# Patient Record
Sex: FEMALE | Race: WHITE | URBAN - METROPOLITAN AREA
[De-identification: names, ages, dates, MRNs, and addresses within clinical notes are randomized per-mention and may not be internally consistent; named-entity substitution may affect disease eponyms.]

---

## 2023-05-09 ENCOUNTER — PROCEDURE (OUTPATIENT)
Facility: LOCATION | Age: 43
End: 2023-05-09
Payer: COMMERCIAL

## 2023-05-10 ENCOUNTER — POST-OPERATIVE VISIT (OUTPATIENT)
Facility: LOCATION | Age: 43
End: 2023-05-10
Payer: COMMERCIAL

## 2023-05-10 DIAGNOSIS — Z96.1 PRESENCE OF INTRAOCULAR LENS: Primary | ICD-10-CM

## 2023-05-10 PROCEDURE — 99024 POSTOP FOLLOW-UP VISIT: CPT | Performed by: OPTOMETRIST

## 2023-05-10 ASSESSMENT — INTRAOCULAR PRESSURE
OD: 20
OS: 18

## 2023-05-10 NOTE — IMPRESSION/PLAN
Impression: S/P CE/IOL VIVITY TRIFOCAL TORIC FEMTO ORA goal distance OD - 1 Day. Presence of intraocular lens  Z96.1. s/p CEIOL OD/OS surgery doing well. OD: 05/09/2023 PCIOL centered and clear. (-) Siedel sign, (-) suture (+) BCL OD - Removed in office today. Plan: Patient to continue OD: Moxifloxacin TID, Prednisolone Q2h, and Prolensa QD. OU: AT's PRN. RTC as scheduled for 1 week follow up Dr. Julisa Barragan on 05/12/2023 for 2nd eye clearance and consents. RTC PRN decrease VA, increase pain, redness, discharge, photophobia, flashes/floaters or other vision problems. Patient reminded of post-operative restrictions (do not rub eye, do not bend over, do not  more than 10-15 lbs, do not sleep on side of surgery, do not get water, dust or dirt in eye, wear plastic shield while sleeping).

## 2023-05-12 ENCOUNTER — POST-OPERATIVE VISIT (OUTPATIENT)
Facility: LOCATION | Age: 43
End: 2023-05-12
Payer: COMMERCIAL

## 2023-05-12 DIAGNOSIS — Z48.810 ENCOUNTER FOR SURGICAL AFTERCARE FOLLOWING SURGERY ON A SENSE ORGAN: Primary | ICD-10-CM

## 2023-05-12 DIAGNOSIS — H25.042 POSTERIOR SUBCAPSULAR POLAR AGE-RELATED CATARACT, LEFT EYE: ICD-10-CM

## 2023-05-12 PROCEDURE — 99024 POSTOP FOLLOW-UP VISIT: CPT

## 2023-05-12 ASSESSMENT — KERATOMETRY: OD: 43.56

## 2023-05-12 ASSESSMENT — VISUAL ACUITY: OD: 20/25

## 2023-05-12 ASSESSMENT — INTRAOCULAR PRESSURE
OS: 18
OD: 18

## 2023-05-12 NOTE — IMPRESSION/PLAN
Impression: Posterior subcapsular polar age-related cataract, left eye  H25.042. Plan: Discussed R/B/A of cataract surgery including risk of bleeding, infection, decreased BCVA, loss of eye. No guarantees, possible need  for further surgery. Patient expressed good understanding and wishes to proceed with CE/IOL OD then OS. Patient Elects to have cataract surgery + Vivity Trifocal + FEMTO + ORA OU. Pt aware she may need glasses after her sx for very up close vision. Explained to patient less than full visual recovery due to existing macular conditions. Patient expresses understanding. Plan cataract surgery OD 05/09/23. OS 05/16/23. Goal OD Distant. Goal OS -0.3 to -0.6. Cataract surgery packet given to patient. Patient advised to bring their surgery drops to all their upcoming appointments and that their surgery could be canceled if they do not bring their drops to their surgery.

## 2023-05-12 NOTE — IMPRESSION/PLAN
Impression: S/P CE/IOL Vivity Trifocal Toric FEMTO OD - 3 Days. Encounter for surgical aftercare following surgery on a sense organ  Z48.810. Plan: Starting 5/16/23. D/c Moxifloxacin. Continue Prolensa x 3 weeks then stop. Taper prednisolone to BID O_ x 3 weeks then stop. Updated medication schedule reviewed and given to pt. All restrictions lifted. RTC PRN decrease VA, increase pain, redness, discharge, photophobia, flashes/floaters, or other vision problems. Discussed R/B/A of cataract surgery including risk of bleeding, infection, decreased BCVA, loss of eye. No guarantees, possible need  for further surgery. Patient expressed good understanding and wishes to proceed with CE/IOL.

## 2023-05-16 ENCOUNTER — PROCEDURE (OUTPATIENT)
Facility: LOCATION | Age: 43
End: 2023-05-16
Payer: COMMERCIAL

## 2023-05-17 ENCOUNTER — POST-OPERATIVE VISIT (OUTPATIENT)
Facility: LOCATION | Age: 43
End: 2023-05-17
Payer: COMMERCIAL

## 2023-05-17 DIAGNOSIS — Z96.1 PRESENCE OF INTRAOCULAR LENS: Primary | ICD-10-CM

## 2023-05-17 PROCEDURE — 99024 POSTOP FOLLOW-UP VISIT: CPT | Performed by: OPHTHALMOLOGY

## 2023-05-17 ASSESSMENT — INTRAOCULAR PRESSURE
OD: 22
OS: 14

## 2023-05-17 ASSESSMENT — VISUAL ACUITY: OD: 20/25

## 2023-05-17 NOTE — IMPRESSION/PLAN
Impression: S/P CE/IOL Vivity Multifocal FEMTO ORA GOAL -0.3 to -0.6 OU - 1 Day. Presence of intraocular lens  Z96.1. Plan: Patient to continue Moxifloxacin TID OS, Prolensa QD OS, and Prednisolone q2h WA OD. D/c Moxifloxacin OD. Continue Prolensa QD OD x 3 weeks then stop. Taper Prednisolone to BID OD x 3 weeks then stop. Updated medication schedule reviewed and given to pt. Explained to pt that the IOL placed is smaller than the natural lens and it is normal for her to sometimes notice the edge/reflections of the edge. Explained to pt that it is likely she will be able to learn to ignore it. Pt expresses understanding. RTC 1 week - check VA, Ks, MR, IOP and re-evaluation of operated eye. Patient reminded of post-operative restrictions (do not rub eye, do not bend over, do not  more than 10-15 lbs, do not sleep on side of surgery, do not get water, dust or dirt in eye, wear plastic shield while sleeping). RTC PRN decrease VA, increase pain, redness, discharge, photophobia, flashes/floaters, or other vision problems.

## 2023-05-22 ENCOUNTER — POST-OPERATIVE VISIT (OUTPATIENT)
Facility: LOCATION | Age: 43
End: 2023-05-22
Payer: COMMERCIAL

## 2023-05-22 DIAGNOSIS — Z96.1 PRESENCE OF INTRAOCULAR LENS: Primary | ICD-10-CM

## 2023-05-22 PROCEDURE — 99024 POSTOP FOLLOW-UP VISIT: CPT | Performed by: OPHTHALMOLOGY

## 2023-05-22 ASSESSMENT — INTRAOCULAR PRESSURE
OD: 19
OS: 21

## 2023-05-22 ASSESSMENT — VISUAL ACUITY: OS: 20/20

## 2023-05-22 NOTE — IMPRESSION/PLAN
Impression: S/P CE/IOL Vivity Multifocal FEMTO ORA GOAL -0.3 to -0.6 OS - 6 Days. Presence of intraocular lens  Z96.1. Plan: D/c Moxifloxacin OS. Continue Prolensa QD OS x 3 weeks then stop. Taper Prednisolone to BID OS x 3 weeks then stop. Updated medication schedule reviewed and given to pt. Continue Prolensa QD OD and Prednisolone BID OD x 2 weeks then stop. All restrictions lifted. Explained to pt that her vision is still healing. Explained to pt that she can obtain a temporary pair of reading glasses like +1.00 or +2.00 while her vision stabilizes. RTC in 1 month for re-evaluation with Dr. Kristofer Worthy. MAC OCT at that visit. RTC PRN decrease VA, increase pain, redness, discharge, photophobia, flashes/floaters, or other vision problems.

## 2023-06-19 ENCOUNTER — POST-OPERATIVE VISIT (OUTPATIENT)
Facility: LOCATION | Age: 43
End: 2023-06-19
Payer: COMMERCIAL

## 2023-06-19 DIAGNOSIS — Z96.1 PRESENCE OF INTRAOCULAR LENS: Primary | ICD-10-CM

## 2023-06-19 DIAGNOSIS — H16.223 KERATOCONJUNCT SICCA, NOT SPECIFIED AS SJOGREN'S, BILATERAL: ICD-10-CM

## 2023-06-19 DIAGNOSIS — H04.123 DRY EYE SYNDROME OF BILATERAL LACRIMAL GLANDS: ICD-10-CM

## 2023-06-19 PROCEDURE — 92134 CPTRZ OPH DX IMG PST SGM RTA: CPT | Performed by: OPHTHALMOLOGY

## 2023-06-19 ASSESSMENT — VISUAL ACUITY
OS: 20/20
OD: 20/20

## 2023-06-19 NOTE — IMPRESSION/PLAN
Impression: Examination revealed dry eye syndrome secondary to tear deficiencies. s/p ext x4 No erythromycin rosemarie due to allergy. Plan: Patient to increase the use of Oasis tears QID -> Q2Hrs OU, restart [place] 3's & restart Warm Compresses QHS OU.

## 2023-06-19 NOTE — IMPRESSION/PLAN
Impression: S/P CE/IOL Vivity Multifocal FEMTO ORA GOAL -0.3 to -0.6 OS. S/P CE/IOL Vivity Trifocal TORIC FEMTO ORA GOAL distance OD. Presence of intraocular lens  Z96.1. Plan: MAC OCT order today. Explained to pt that her vision is still healing. Explained to pt that she can obtain a temporary pair of reading glasses like +1.00 or +2.00 while her vision stabilizes. Discussed with patient that most of the time, the eye will heal as expected following cataract surgery but that sometimes, there is a chance the lens can flex forward or backward and not heal as expected. Discussed with patient they have the option of LVC enhancement if needed in the future. Patient expresses understanding. Patient mentioned glares temporal OD. 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 RTC in 6 weeks for re-evaluation with Dr. Ana Owusu. Will discusse possible laser touch-up to correct near vision. Patient to increase the use of Oasis tears QID -> Q2Hrs OU, restart [place] 3's & restart Warm Compresses QHS OU. Unable to [facility] due to ineligible insurance. RTC PRN decrease VA, increase pain, redness, discharge, photophobia, flashes/floaters, or other vision problems.

## 2023-08-28 ENCOUNTER — OFFICE VISIT (OUTPATIENT)
Facility: LOCATION | Age: 43
End: 2023-08-28
Payer: COMMERCIAL

## 2023-08-28 DIAGNOSIS — H04.123 DRY EYE SYNDROME OF BILATERAL LACRIMAL GLANDS: ICD-10-CM

## 2023-08-28 DIAGNOSIS — H16.223 KERATOCONJUNCT SICCA, NOT SPECIFIED AS SJOGREN'S, BILATERAL: ICD-10-CM

## 2023-08-28 DIAGNOSIS — H26.493 OTHER BILATERAL SECONDARY CATARACT: ICD-10-CM

## 2023-08-28 DIAGNOSIS — Z96.1 PRESENCE OF INTRAOCULAR LENS: Primary | ICD-10-CM

## 2023-08-28 PROCEDURE — 99213 OFFICE O/P EST LOW 20 MIN: CPT | Performed by: OPHTHALMOLOGY

## 2023-08-28 ASSESSMENT — INTRAOCULAR PRESSURE
OS: 18
OD: 18

## 2023-09-18 ENCOUNTER — OFFICE VISIT (OUTPATIENT)
Facility: LOCATION | Age: 43
End: 2023-09-18
Payer: COMMERCIAL

## 2023-09-18 DIAGNOSIS — H26.493 OTHER BILATERAL SECONDARY CATARACT: ICD-10-CM

## 2023-09-18 DIAGNOSIS — H26.491 OTHER SECONDARY CATARACT, RIGHT EYE: Primary | ICD-10-CM

## 2023-09-18 PROCEDURE — 66821 AFTER CATARACT LASER SURGERY: CPT | Performed by: OPHTHALMOLOGY

## 2023-09-18 RX ORDER — PREDNISOLONE ACETATE 10 MG/ML
1 % SUSPENSION/ DROPS OPHTHALMIC
Qty: 15 | Refills: 0 | Status: ACTIVE
Start: 2023-09-18

## 2023-09-18 ASSESSMENT — INTRAOCULAR PRESSURE
OS: 18
OD: 16

## 2023-09-29 ENCOUNTER — OFFICE VISIT (OUTPATIENT)
Facility: LOCATION | Age: 43
End: 2023-09-29
Payer: COMMERCIAL

## 2023-09-29 DIAGNOSIS — Z98.890 OTHER SPECIFIED POSTPROCEDURAL STATES: ICD-10-CM

## 2023-09-29 DIAGNOSIS — H16.223 KERATOCONJUNCT SICCA, NOT SPECIFIED AS SJOGREN'S, BILATERAL: ICD-10-CM

## 2023-09-29 DIAGNOSIS — H04.123 DRY EYE SYNDROME OF BILATERAL LACRIMAL GLANDS: ICD-10-CM

## 2023-09-29 DIAGNOSIS — H26.492 OTHER SECONDARY CATARACT, LEFT EYE: Primary | ICD-10-CM

## 2023-09-29 PROCEDURE — 66821 AFTER CATARACT LASER SURGERY: CPT | Performed by: OPHTHALMOLOGY

## 2023-09-29 RX ORDER — CYCLOSPORINE 0 G/ML
0.09 % SOLUTION/ DROPS OPHTHALMIC; TOPICAL
Qty: 60 | Refills: 5 | Status: ACTIVE
Start: 2023-09-29

## 2023-09-29 ASSESSMENT — INTRAOCULAR PRESSURE
OS: 14
OD: 16
OS: 17

## 2023-10-06 ENCOUNTER — POST-OPERATIVE VISIT (OUTPATIENT)
Facility: LOCATION | Age: 43
End: 2023-10-06
Payer: COMMERCIAL

## 2023-10-06 DIAGNOSIS — H04.123 DRY EYE SYNDROME OF BILATERAL LACRIMAL GLANDS: ICD-10-CM

## 2023-10-06 DIAGNOSIS — Z48.810 ENCOUNTER FOR SURGICAL AFTERCARE FOLLOWING SURGERY ON A SENSE ORGAN: Primary | ICD-10-CM

## 2023-10-06 ASSESSMENT — VISUAL ACUITY
OD: 20/20
OS: 20/25

## 2023-10-06 ASSESSMENT — INTRAOCULAR PRESSURE
OD: 16
OS: 18

## 2023-11-02 ENCOUNTER — OFFICE VISIT (OUTPATIENT)
Facility: LOCATION | Age: 43
End: 2023-11-02
Payer: COMMERCIAL

## 2023-11-02 DIAGNOSIS — H04.123 DRY EYE SYNDROME OF BILATERAL LACRIMAL GLANDS: Primary | ICD-10-CM

## 2023-11-02 DIAGNOSIS — H16.223 KERATOCONJUNCT SICCA, NOT SPECIFIED AS SJOGREN'S, BILATERAL: ICD-10-CM

## 2023-11-02 DIAGNOSIS — Z96.1 PRESENCE OF INTRAOCULAR LENS: ICD-10-CM

## 2023-11-02 DIAGNOSIS — Z98.890 OTHER SPECIFIED POSTPROCEDURAL STATE: ICD-10-CM

## 2023-11-02 ASSESSMENT — INTRAOCULAR PRESSURE
OS: 16
OD: 16

## 2024-02-13 ENCOUNTER — POST-OPERATIVE VISIT (OUTPATIENT)
Facility: LOCATION | Age: 44
End: 2024-02-13
Payer: COMMERCIAL

## 2024-02-13 DIAGNOSIS — Z96.1 PRESENCE OF INTRAOCULAR LENS: Primary | ICD-10-CM

## 2024-02-13 PROCEDURE — 99024 POSTOP FOLLOW-UP VISIT: CPT | Performed by: OPTOMETRIST

## 2024-02-13 ASSESSMENT — KERATOMETRY
OD: 43.25
OS: 43.38

## 2024-02-13 ASSESSMENT — VISUAL ACUITY
OD: 20/20
OS: 20/20

## 2024-04-05 ENCOUNTER — REFRACTIVE (OUTPATIENT)
Facility: LOCATION | Age: 44
End: 2024-04-05

## 2024-04-05 DIAGNOSIS — H16.223 KERATOCONJUNCT SICCA, NOT SPECIFIED AS SJOGREN'S, BILATERAL: Primary | ICD-10-CM

## 2024-04-05 DIAGNOSIS — Z96.1 PRESENCE OF INTRAOCULAR LENS: ICD-10-CM

## 2024-04-05 ASSESSMENT — KERATOMETRY
OS: 43.50
OD: 43.45

## 2024-04-05 ASSESSMENT — VISUAL ACUITY
OS: 20/20
OD: 20/20

## 2024-04-05 ASSESSMENT — INTRAOCULAR PRESSURE
OS: 14
OD: 14

## 2024-04-30 ENCOUNTER — REFRACTIVE (OUTPATIENT)
Facility: LOCATION | Age: 44
End: 2024-04-30
Payer: COMMERCIAL

## 2024-04-30 DIAGNOSIS — Z96.1 PRESENCE OF INTRAOCULAR LENS: Primary | ICD-10-CM

## 2024-04-30 DIAGNOSIS — H16.223 KERATOCONJUNCT SICCA, NOT SPECIFIED AS SJOGREN'S, BILATERAL: ICD-10-CM

## 2024-04-30 ASSESSMENT — VISUAL ACUITY
OD: 20/20
OS: 20/20

## 2024-06-25 ENCOUNTER — POST-OPERATIVE VISIT (OUTPATIENT)
Facility: LOCATION | Age: 44
End: 2024-06-25
Payer: COMMERCIAL

## 2024-06-25 DIAGNOSIS — Z96.1 PRESENCE OF INTRAOCULAR LENS: Primary | ICD-10-CM

## 2024-06-25 PROCEDURE — 99024 POSTOP FOLLOW-UP VISIT: CPT | Performed by: OPTOMETRIST

## 2024-06-25 ASSESSMENT — KERATOMETRY
OD: 43.25
OS: 43.00

## 2024-06-25 ASSESSMENT — VISUAL ACUITY
OD: 20/20
OS: 20/20

## 2024-09-12 ENCOUNTER — Encounter (OUTPATIENT)
Facility: LOCATION | Age: 44
End: 2024-09-12

## 2024-09-13 ENCOUNTER — POST-OPERATIVE VISIT (OUTPATIENT)
Facility: LOCATION | Age: 44
End: 2024-09-13

## 2024-09-13 DIAGNOSIS — Z48.810 ENCOUNTER FOR SURGICAL AFTERCARE FOLLOWING SURGERY ON A SENSE ORGAN: Primary | ICD-10-CM

## 2024-09-13 PROCEDURE — 99024 POSTOP FOLLOW-UP VISIT: CPT | Performed by: OPTOMETRIST

## 2024-09-16 ENCOUNTER — POST-OPERATIVE VISIT (OUTPATIENT)
Facility: LOCATION | Age: 44
End: 2024-09-16

## 2024-09-16 DIAGNOSIS — Z48.810 ENCOUNTER FOR SURGICAL AFTERCARE FOLLOWING SURGERY ON A SENSE ORGAN: Primary | ICD-10-CM

## 2024-09-16 PROCEDURE — 99024 POSTOP FOLLOW-UP VISIT: CPT | Performed by: OPTOMETRIST

## 2024-10-07 ENCOUNTER — POST-OPERATIVE VISIT (OUTPATIENT)
Facility: LOCATION | Age: 44
End: 2024-10-07
Payer: COMMERCIAL

## 2024-10-07 DIAGNOSIS — H16.223 KERATOCONJUNCT SICCA, NOT SPECIFIED AS SJOGREN'S, BILATERAL: ICD-10-CM

## 2024-10-07 DIAGNOSIS — H04.123 DRY EYE SYNDROME OF BILATERAL LACRIMAL GLANDS: ICD-10-CM

## 2024-10-07 DIAGNOSIS — Z48.810 ENCOUNTER FOR SURGICAL AFTERCARE FOLLOWING SURGERY ON A SENSE ORGAN: Primary | ICD-10-CM

## 2024-10-07 PROCEDURE — 99024 POSTOP FOLLOW-UP VISIT: CPT | Performed by: OPHTHALMOLOGY

## 2024-10-07 RX ORDER — CYCLOSPORINE OPHTHALMIC SOLUTION 1 MG/ML
0.1 % SOLUTION/ DROPS OPHTHALMIC
Qty: 2 | Refills: 10 | Status: ACTIVE
Start: 2024-10-07

## 2024-10-07 ASSESSMENT — VISUAL ACUITY
OD: 20/40
OS: 20/30

## 2024-10-07 ASSESSMENT — INTRAOCULAR PRESSURE
OS: 13
OD: 13

## 2024-10-22 ENCOUNTER — POST-OPERATIVE VISIT (OUTPATIENT)
Facility: LOCATION | Age: 44
End: 2024-10-22
Payer: COMMERCIAL

## 2024-10-22 DIAGNOSIS — Z48.810 ENCOUNTER FOR SURGICAL AFTERCARE FOLLOWING SURGERY ON A SENSE ORGAN: Primary | ICD-10-CM

## 2024-10-22 PROCEDURE — 99024 POSTOP FOLLOW-UP VISIT: CPT | Performed by: OPTOMETRIST

## 2024-10-22 ASSESSMENT — KERATOMETRY
OS: 45.25
OD: 44.00

## 2024-10-22 ASSESSMENT — INTRAOCULAR PRESSURE
OS: 25
OD: 23

## 2024-10-22 ASSESSMENT — VISUAL ACUITY
OD: 20/30
OS: 20/30

## 2024-10-29 ENCOUNTER — POST-OPERATIVE VISIT (OUTPATIENT)
Facility: LOCATION | Age: 44
End: 2024-10-29
Payer: COMMERCIAL

## 2024-10-29 DIAGNOSIS — Z48.810 ENCOUNTER FOR SURGICAL AFTERCARE FOLLOWING SURGERY ON A SENSE ORGAN: Primary | ICD-10-CM

## 2024-10-29 PROCEDURE — 99024 POSTOP FOLLOW-UP VISIT: CPT | Performed by: OPTOMETRIST

## 2024-10-29 ASSESSMENT — INTRAOCULAR PRESSURE
OD: 17
OD: 21
OS: 18
OS: 24

## 2024-11-20 ENCOUNTER — POST-OPERATIVE VISIT (OUTPATIENT)
Facility: LOCATION | Age: 44
End: 2024-11-20
Payer: COMMERCIAL

## 2024-11-20 DIAGNOSIS — Z48.810 ENCOUNTER FOR SURGICAL AFTERCARE FOLLOWING SURGERY ON A SENSE ORGAN: Primary | ICD-10-CM

## 2024-11-20 DIAGNOSIS — H40.043 STEROID RESPONDER, BILATERAL: ICD-10-CM

## 2024-11-20 DIAGNOSIS — H16.223 KERATOCONJUNCT SICCA, NOT SPECIFIED AS SJOGREN'S, BILATERAL: ICD-10-CM

## 2024-11-20 PROCEDURE — 99024 POSTOP FOLLOW-UP VISIT: CPT | Performed by: OPHTHALMOLOGY

## 2024-11-20 RX ORDER — PERFLUOROHEXYLOCTANE 1 MG/MG
100 % SOLUTION OPHTHALMIC
Qty: 5 | Refills: 3 | Status: ACTIVE
Start: 2024-11-20

## 2024-11-20 ASSESSMENT — INTRAOCULAR PRESSURE
OS: 17
OD: 19

## 2024-11-20 ASSESSMENT — VISUAL ACUITY
OS: 20/40
OD: 20/30

## 2024-12-10 ENCOUNTER — POST-OPERATIVE VISIT (OUTPATIENT)
Facility: LOCATION | Age: 44
End: 2024-12-10
Payer: COMMERCIAL

## 2024-12-10 DIAGNOSIS — Z48.810 ENCOUNTER FOR SURGICAL AFTERCARE FOLLOWING SURGERY ON A SENSE ORGAN: Primary | ICD-10-CM

## 2024-12-10 DIAGNOSIS — H16.223 KERATOCONJUNCT SICCA, NOT SPECIFIED AS SJOGREN'S, BILATERAL: ICD-10-CM

## 2024-12-10 PROCEDURE — 99024 POSTOP FOLLOW-UP VISIT: CPT | Performed by: OPTOMETRIST

## 2024-12-10 ASSESSMENT — VISUAL ACUITY
OS: 20/40
OD: 20/25

## 2024-12-10 ASSESSMENT — INTRAOCULAR PRESSURE
OS: 18
OD: 19

## 2025-01-15 ENCOUNTER — POST-OPERATIVE VISIT (OUTPATIENT)
Facility: LOCATION | Age: 45
End: 2025-01-15
Payer: COMMERCIAL

## 2025-01-15 DIAGNOSIS — Z48.810 ENCOUNTER FOR SURGICAL AFTERCARE FOLLOWING SURGERY ON A SENSE ORGAN: Primary | ICD-10-CM

## 2025-01-15 PROCEDURE — 99024 POSTOP FOLLOW-UP VISIT: CPT | Performed by: OPHTHALMOLOGY

## 2025-01-15 RX ORDER — CYCLOSPORINE OPHTHALMIC SOLUTION 1 MG/ML
0.1 % SOLUTION/ DROPS OPHTHALMIC
Qty: 60 | Refills: 10 | Status: ACTIVE
Start: 2025-01-15

## 2025-01-15 ASSESSMENT — VISUAL ACUITY
OS: 20/25
OD: 20/25

## 2025-01-15 ASSESSMENT — INTRAOCULAR PRESSURE
OS: 19
OD: 19

## 2025-01-15 ASSESSMENT — KERATOMETRY
OD: 43.88
OS: 44.63